# Patient Record
Sex: FEMALE | Race: WHITE | NOT HISPANIC OR LATINO | Employment: OTHER | URBAN - METROPOLITAN AREA
[De-identification: names, ages, dates, MRNs, and addresses within clinical notes are randomized per-mention and may not be internally consistent; named-entity substitution may affect disease eponyms.]

---

## 2018-09-24 ENCOUNTER — LAB (OUTPATIENT)
Dept: LAB | Facility: HOSPITAL | Age: 73
End: 2018-09-24

## 2018-09-24 ENCOUNTER — OFFICE VISIT (OUTPATIENT)
Dept: NEUROLOGY | Facility: CLINIC | Age: 73
End: 2018-09-24

## 2018-09-24 VITALS — SYSTOLIC BLOOD PRESSURE: 108 MMHG | DIASTOLIC BLOOD PRESSURE: 82 MMHG

## 2018-09-24 DIAGNOSIS — R25.1 TREMOR: ICD-10-CM

## 2018-09-24 DIAGNOSIS — G60.9 IDIOPATHIC PERIPHERAL NEUROPATHY: ICD-10-CM

## 2018-09-24 DIAGNOSIS — G60.9 IDIOPATHIC PERIPHERAL NEUROPATHY: Primary | ICD-10-CM

## 2018-09-24 DIAGNOSIS — R42 DIZZINESS: ICD-10-CM

## 2018-09-24 PROBLEM — I10 HYPERTENSION: Status: ACTIVE | Noted: 2018-09-24

## 2018-09-24 PROBLEM — E78.5 HYPERLIPIDEMIA: Status: ACTIVE | Noted: 2018-09-24

## 2018-09-24 PROBLEM — G62.9 PERIPHERAL NEUROPATHY: Status: ACTIVE | Noted: 2018-09-24

## 2018-09-24 PROBLEM — M19.90 ARTHRITIS: Status: ACTIVE | Noted: 2018-09-24

## 2018-09-24 PROBLEM — E03.9 HYPOTHYROIDISM: Status: ACTIVE | Noted: 2018-09-24

## 2018-09-24 LAB
CRP SERPL-MCNC: 0.06 MG/DL (ref 0–1)
ERYTHROCYTE [SEDIMENTATION RATE] IN BLOOD: 40 MM/HR (ref 0–30)
FOLATE SERPL-MCNC: 21.51 NG/ML (ref 3.2–20)
HBA1C MFR BLD: 6.1 % (ref 4.8–5.6)
T4 FREE SERPL-MCNC: 0.94 NG/DL (ref 0.89–1.76)
TSH SERPL DL<=0.05 MIU/L-ACNC: 1.89 MIU/ML (ref 0.35–5.35)
VIT B12 BLD-MCNC: 424 PG/ML (ref 211–911)

## 2018-09-24 PROCEDURE — 84443 ASSAY THYROID STIM HORMONE: CPT

## 2018-09-24 PROCEDURE — 86038 ANTINUCLEAR ANTIBODIES: CPT

## 2018-09-24 PROCEDURE — 82746 ASSAY OF FOLIC ACID SERUM: CPT

## 2018-09-24 PROCEDURE — 84165 PROTEIN E-PHORESIS SERUM: CPT

## 2018-09-24 PROCEDURE — 84439 ASSAY OF FREE THYROXINE: CPT

## 2018-09-24 PROCEDURE — 36415 COLL VENOUS BLD VENIPUNCTURE: CPT

## 2018-09-24 PROCEDURE — 99204 OFFICE O/P NEW MOD 45 MIN: CPT | Performed by: PSYCHIATRY & NEUROLOGY

## 2018-09-24 PROCEDURE — 83036 HEMOGLOBIN GLYCOSYLATED A1C: CPT

## 2018-09-24 PROCEDURE — 84207 ASSAY OF VITAMIN B-6: CPT

## 2018-09-24 PROCEDURE — 84155 ASSAY OF PROTEIN SERUM: CPT

## 2018-09-24 PROCEDURE — 82607 VITAMIN B-12: CPT

## 2018-09-24 PROCEDURE — 86140 C-REACTIVE PROTEIN: CPT

## 2018-09-24 RX ORDER — ASPIRIN 81 MG/1
81 TABLET ORAL DAILY
COMMUNITY

## 2018-09-24 RX ORDER — OMEPRAZOLE 20 MG/1
20 CAPSULE, DELAYED RELEASE ORAL DAILY
COMMUNITY

## 2018-09-24 RX ORDER — ONDANSETRON 4 MG/1
4 TABLET, FILM COATED ORAL EVERY 8 HOURS PRN
COMMUNITY

## 2018-09-24 RX ORDER — FLUDROCORTISONE ACETATE 0.1 MG/1
TABLET ORAL
Qty: 30 TABLET | Refills: 3 | Status: SHIPPED | OUTPATIENT
Start: 2018-09-24 | End: 2018-12-31 | Stop reason: SDUPTHER

## 2018-09-24 RX ORDER — LORAZEPAM 0.5 MG/1
0.5 TABLET ORAL 2 TIMES DAILY
COMMUNITY

## 2018-09-24 RX ORDER — GABAPENTIN 400 MG/1
400 CAPSULE ORAL 3 TIMES DAILY
COMMUNITY

## 2018-09-24 RX ORDER — AMLODIPINE BESYLATE 5 MG/1
5 TABLET ORAL DAILY
COMMUNITY

## 2018-09-24 RX ORDER — DULOXETIN HYDROCHLORIDE 60 MG/1
60 CAPSULE, DELAYED RELEASE ORAL DAILY
COMMUNITY

## 2018-09-24 RX ORDER — DONEPEZIL HYDROCHLORIDE 10 MG/1
10 TABLET, FILM COATED ORAL NIGHTLY
COMMUNITY

## 2018-09-24 RX ORDER — HYDROCHLOROTHIAZIDE 25 MG/1
25 TABLET ORAL DAILY
COMMUNITY

## 2018-09-24 RX ORDER — LEVOTHYROXINE SODIUM 0.03 MG/1
25 TABLET ORAL DAILY
COMMUNITY

## 2018-09-24 NOTE — PROGRESS NOTES
Subjective:    CC: Su Duffy is seen today in consultation at the request of Shakir Agustin DO for Tremors and dizziness       HPI:  73-year-old female accompanied by her daughter and granddaughter with a history of hypertension, hypothyroidism, neuropathy, hyperlipidemia, cognitive impairment, anxiety, arthritis presents with tremors and dizziness.  As per patient she had shingles last year for which she was treated with acyclovir.  During the treatment she had an episode of confusion with visual hallucinations hence the treatment was stopped.  After that she was noted to have cognitive problems and started on Aricept.  She has not had any more episodes since then.  However she has started to feel extremely lightheaded  in the past year with episodes of dizziness and balance problems more so when she stands up.  This has resulted in her falling down a few times although she denies losing consciousness.  She also gets tremors in both hands especially in the morning.  The tremors worsen on performing tasks or reaching out for objects.  She denies any family history of tremors.  She also denies having an difficulty with smell but does complain of constipation and acting out her dreams occasionally (as per daughter).  Currently she denies having any  auditory hallucinations but does see images in the corner of her eye at times like someone passing through.  Has a history of cataracts.  Also has occasional headaches but denies any scalp tenderness or jaw claudication.  She has tingling and numbness in the feet for which she has had no workup.  Denies a history of diabetes or  alcohol intake.  However she was started on gabapentin that has helped with the symptoms.  She had an MRI brain in February 2018 that showed age appropriate minimal white matter changes but no acute intracranial abnormality    The following portions of the patient's history were reviewed today and updated as of 09/24/2018  : allergies,  current medications, past family history, past medical history, past social history, past surgical history and problem list  These document will be scanned to patient's chart.      Current Outpatient Prescriptions:   •  amLODIPine (NORVASC) 5 MG tablet, Take 5 mg by mouth Daily., Disp: , Rfl:   •  aspirin 81 MG EC tablet, Take 81 mg by mouth Daily., Disp: , Rfl:   •  donepezil (ARICEPT) 10 MG tablet, Take 10 mg by mouth Every Night., Disp: , Rfl:   •  DULoxetine (CYMBALTA) 60 MG capsule, Take 60 mg by mouth Daily., Disp: , Rfl:   •  gabapentin (NEURONTIN) 400 MG capsule, Take 400 mg by mouth 3 (Three) Times a Day., Disp: , Rfl:   •  hydrochlorothiazide (HYDRODIURIL) 25 MG tablet, Take 25 mg by mouth Daily., Disp: , Rfl:   •  levothyroxine (SYNTHROID, LEVOTHROID) 25 MCG tablet, Take 25 mcg by mouth Daily., Disp: , Rfl:   •  Loratadine-Pseudoephedrine (CLARITIN-D 12 HOUR PO), Take  by mouth., Disp: , Rfl:   •  LORazepam (ATIVAN) 0.5 MG tablet, Take 0.5 mg by mouth 2 (Two) Times a Day., Disp: , Rfl:   •  metoprolol tartrate (LOPRESSOR) 25 MG tablet, Take 25 mg by mouth 2 (Two) Times a Day., Disp: , Rfl:   •  omeprazole (priLOSEC) 20 MG capsule, Take 20 mg by mouth Daily., Disp: , Rfl:   •  ondansetron (ZOFRAN) 4 MG tablet, Take 4 mg by mouth Every 8 (Eight) Hours As Needed for Nausea or Vomiting., Disp: , Rfl:   •  fludrocortisone 0.1 MG tablet, Take one tablet daily with food.  Avoid lying down for 2 hours after taking the medication., Disp: 30 tablet, Rfl: 3   Past Medical History:   Diagnosis Date   • Anxiety    • Arthritis    • Hyperlipidemia    • Hypertension       History reviewed. No pertinent surgical history.   Family History   Problem Relation Age of Onset   • Cancer Mother    • Heart disease Father    • Stroke Father    • Diabetes Sister    • Heart disease Sister    • Hypertension Sister    • Cancer Brother    • Diabetes Brother    • Heart disease Brother    • Hypertension Brother       Social History      Social History   • Marital status: Unknown     Spouse name: N/A   • Number of children: N/A   • Years of education: N/A     Occupational History   • Not on file.     Social History Main Topics   • Smoking status: Former Smoker   • Smokeless tobacco: Never Used   • Alcohol use No   • Drug use: No   • Sexual activity: Defer     Other Topics Concern   • Not on file     Social History Narrative   • No narrative on file     Review of Systems   Constitutional: Positive for activity change, chills and fatigue.   Eyes: Positive for photophobia, pain, itching and visual disturbance.   Gastrointestinal: Positive for abdominal pain and constipation.   Endocrine: Positive for polydipsia.   Musculoskeletal: Positive for arthralgias, back pain, gait problem, joint swelling and neck stiffness.   Skin: Positive for skin lesions.   Neurological: Positive for tremors and weakness.   Hematological: Bruises/bleeds easily.   Psychiatric/Behavioral: Positive for decreased concentration.   All other systems reviewed and are negative.      Objective:    /82 (BP Location: Right arm, Patient Position: Sitting)     Neurology Exam:    General apperance: NAD.  Good temporal artery pulsations,no scalp tenderness on palpation or jaw claudication, orthostatics showed a drop of 14 mmHg on systolic blood pressure on standing up    Mental status: Alert, awake and oriented to time place and person.    Recent and Remote memory: Mildly impaired    Attention span and Concentration: Normal.     Language and Speech: Intact- No dysarthria.    Fluency, Naming , Repitition and Comprehension:  Intact    Cranial Nerves:   CN II: Visual fields are full. Intact. Fundi - Normal, No papillederma, Pupils - FLORIN  CN III, IV and VI: Extraocular movements are intact. Normal saccades.   CN V: Facial sensation is intact.   CN VII: Muscles of facial expression reveal no asymmetry. Intact.   CN VIII: Hearing is intact. Whispered voice intact.   CN IX and X:  Palate elevates symmetrically. Intact  CN XI: Shoulder shrug is intact.   CN XII: Tongue is midline without evidence of atrophy or fasciculation.     Motor:-No resting tremors appreciated, mild bilateral postural more than intention tremors, could draw a Roths wheel and write without any tremors, no micrographia noted  Right UE muscle strength 5/5. Normal tone.     Left UE muscle strength 5/5. Normal tone.      Right LE muscle strength5/5. Normal tone.     Left LE muscle strength 5/5. Normal tone.      Sensory: Normal light touch, vibration and pinprick sensation bilaterally.    DTRs: 2+ bilaterally in upper and lower extremities.    Babinski: Negative bilaterally.    Co-ordination: Normal finger-to-nose, heel to shin B/L.    Rhomberg: Negative.    Gait: Slow and cautious    Cardiovascular: Regular rate and rhythm without murmur, gallop or rub.    Assessment and Plan:  1. Idiopathic peripheral neuropathy  I will get a neuropathy panel including ESR, CRP as she has occasional headaches.  She can continue gabapentin 400 mg 3 times a day for now.  - NANDINI; Future  - Protein Elec + Interp, Serum; Future  - Sedimentation Rate; Future  - TSH+Free T4; Future  - Vitamin B12 & Folate; Future  - Vitamin B6; Future  - C-reactive Protein; Future  - Hemoglobin A1c; Future    2. Dizziness  Most likely due to autonomic dysfunction.  Orthostatics revealed a 14 point drop in systolic blood pressure today.  I have asked her to keep herself well hydrated and started wearing above-knee moderate compression stockings.  I will also start her on fludrocortisone 0.1 mg daily with food.    I have asked her to reduce her dose of amlodipine to 2.5 mg daily and continue the same dose of metoprolol.    3.Tremors-although her tremors are not typical for Parkinson's disease, she does have other features such as slowing of gait, autonomic dysfunction, constipation and REM behavior disorder.  Given her cognitive issues and occasional visual  hallucinations (which may also be because of her cataracts) I feel she could have Lewy body dementia.  I may start her on Sinemet 10/100 mg in the future.       Return in about 6 weeks (around 11/5/2018).     I spent over 50 minutes with the patient face to face out of which over 50% was spent in management, instructions and education.     Katty Dubois MD

## 2018-09-25 LAB
ALBUMIN SERPL-MCNC: 4.1 G/DL (ref 2.9–4.4)
ALBUMIN/GLOB SERPL: 1.2 {RATIO} (ref 0.7–1.7)
ALPHA1 GLOB FLD ELPH-MCNC: 0.3 G/DL (ref 0–0.4)
ALPHA2 GLOB SERPL ELPH-MCNC: 0.9 G/DL (ref 0.4–1)
ANA SER QL: NEGATIVE
B-GLOBULIN SERPL ELPH-MCNC: 1.3 G/DL (ref 0.7–1.3)
GAMMA GLOB SERPL ELPH-MCNC: 0.9 G/DL (ref 0.4–1.8)
GLOBULIN SER CALC-MCNC: 3.4 G/DL (ref 2.2–3.9)
Lab: NORMAL
M-SPIKE: NORMAL G/DL
PROT PATTERN SERPL ELPH-IMP: NORMAL
PROT SERPL-MCNC: 7.5 G/DL (ref 6–8.5)

## 2018-09-27 LAB — VIT B6 SERPL-MCNC: 42.7 UG/L (ref 2–32.8)

## 2018-10-01 ENCOUNTER — TELEPHONE (OUTPATIENT)
Dept: NEUROLOGY | Facility: CLINIC | Age: 73
End: 2018-10-01

## 2018-10-01 NOTE — TELEPHONE ENCOUNTER
Patient daughter called and advised that she will discuss with mom about A1C and patient does not take any type of B6 supplement at this time but already takes a B12 supplement.  Patient daughter also advised that since starting fludrocortisone patient is having insomnia and can not sleep and has had a dul;l headache.

## 2018-10-01 NOTE — TELEPHONE ENCOUNTER
I want her to take the fludrocortisone in the morning with breakfast.  She should also avoid lying down for 2 hours after taking the medication as that could raise her blood pressure which causes a dull headache.  Try these first and if symptoms still persist then I may switch her to another medicine

## 2018-10-01 NOTE — TELEPHONE ENCOUNTER
----- Message from Katty Dubois MD sent at 9/27/2018  9:25 AM EDT -----  Her vitamin B6 level was higher than normal.  If she is taking supplements then she should stop taking them.  A1c is  elevated in the pre-diabetic range and that may also be the cause of her neuropathy.  I feel she should talk to her primary care doctor about possibly starting low doses of metformin.  I also want her to start vitamin B12 500 µg daily

## 2018-10-01 NOTE — TELEPHONE ENCOUNTER
Patient daughter advised that mother has been taken early morning and does not lay down and is eating with it so they are concerned . Spoke to Dr Dubois and she advised to try to take still and we can change medicine at next visit of she is still have problems

## 2018-10-02 NOTE — TELEPHONE ENCOUNTER
Spoke to patient daughter Skylar and she advised that she understood and feel mom had no improved by follow up they could discuss at that time

## 2018-10-03 ENCOUNTER — TELEPHONE (OUTPATIENT)
Dept: NEUROLOGY | Facility: CLINIC | Age: 73
End: 2018-10-03

## 2018-10-03 NOTE — TELEPHONE ENCOUNTER
She should wear them mostly during the daytime especially when she is walking around the house doing chores.  She can take them off during the night

## 2018-10-03 NOTE — TELEPHONE ENCOUNTER
Patient daughter called and wanted to see if mom should wear compression  socks all the time or just at night?

## 2018-11-05 ENCOUNTER — OFFICE VISIT (OUTPATIENT)
Dept: NEUROLOGY | Facility: CLINIC | Age: 73
End: 2018-11-05

## 2018-11-05 VITALS
OXYGEN SATURATION: 96 % | DIASTOLIC BLOOD PRESSURE: 76 MMHG | BODY MASS INDEX: 27.66 KG/M2 | RESPIRATION RATE: 16 BRPM | HEIGHT: 65 IN | WEIGHT: 166 LBS | HEART RATE: 51 BPM | SYSTOLIC BLOOD PRESSURE: 142 MMHG

## 2018-11-05 DIAGNOSIS — R42 DIZZINESS: Primary | ICD-10-CM

## 2018-11-05 DIAGNOSIS — G60.9 IDIOPATHIC PERIPHERAL NEUROPATHY: ICD-10-CM

## 2018-11-05 DIAGNOSIS — G30.0 EARLY ONSET ALZHEIMER'S DEMENTIA WITHOUT BEHAVIORAL DISTURBANCE (HCC): ICD-10-CM

## 2018-11-05 DIAGNOSIS — F02.80 EARLY ONSET ALZHEIMER'S DEMENTIA WITHOUT BEHAVIORAL DISTURBANCE (HCC): ICD-10-CM

## 2018-11-05 PROBLEM — F03.90 DEMENTIA WITHOUT BEHAVIORAL DISTURBANCE (HCC): Status: ACTIVE | Noted: 2018-11-05

## 2018-11-05 PROCEDURE — 99213 OFFICE O/P EST LOW 20 MIN: CPT | Performed by: PSYCHIATRY & NEUROLOGY

## 2018-11-05 NOTE — PROGRESS NOTES
Subjective:    CC: Su Duffy is seen today in consultation at the request of No ref. provider found for Peripheral Neuropathy (6 week follow up)       HPI:  Current visit-since her last visit she has had a tremendous improvement in her dizziness and has had no more falls.  She started wearing the compression stockings and taking fludrocortisone 0.1 mg daily which she is tolerating well now (initially had insomnia with it ).  She also cut down her Norvasc to 2.5 mg daily.  With regards to her neuropathy it is adequately controlled with gabapentin 400 mg 3 times a day.  Her neuropathy panel showed an elevated B6 level and an A1c of 6.1.  Other labs were normal.  Her headaches and tremors have now resolved.  As per daughter she continues to have mild episodes of confusion occasionally.  She can still carry out her activities of daily living but has stopped driving as she has been disoriented a few times while doing so.  Denies having any hallucinations.  Is currently on Aricept 10 mg daily.      Initial [id number] female accompanied by her daughter and granddaughter with a history of hypertension, hypothyroidism, neuropathy, hyperlipidemia, cognitive impairment, anxiety, arthritis presents with tremors and dizziness.  As per patient she had shingles last year for which she was treated with acyclovir.  During the treatment she had an episode of confusion with visual hallucinations hence the treatment was stopped.  After that she was noted to have cognitive problems and started on Aricept.  She has not had any more episodes since then.  However she has started to feel extremely lightheaded  in the past year with episodes of dizziness and balance problems more so when she stands up.  This has resulted in her falling down a few times although she denies losing consciousness.  She also gets tremors in both hands especially in the morning.  The tremors worsen on performing tasks or reaching out for objects.  She  denies any family history of tremors.  She also denies having an difficulty with smell but does complain of constipation and acting out her dreams occasionally (as per daughter).  Currently she denies having any  auditory hallucinations but does see images in the corner of her eye at times like someone passing through.  Has a history of cataracts.  Also has occasional headaches but denies any scalp tenderness or jaw claudication.  She has tingling and numbness in the feet for which she has had no workup.  Denies a history of diabetes or  alcohol intake.  However she was started on gabapentin that has helped with the symptoms.  She had an MRI brain in February 2018 that showed age appropriate minimal white matter changes but no acute intracranial abnormality    The following portions of the patient's history were reviewed today and updated as of 11/05/2018  : allergies, current medications, past family history, past medical history, past social history, past surgical history and problem list  These document will be scanned to patient's chart.      Current Outpatient Prescriptions:   •  amLODIPine (NORVASC) 5 MG tablet, Take 5 mg by mouth Daily., Disp: , Rfl:   •  aspirin 81 MG EC tablet, Take 81 mg by mouth Daily., Disp: , Rfl:   •  donepezil (ARICEPT) 10 MG tablet, Take 10 mg by mouth Every Night., Disp: , Rfl:   •  DULoxetine (CYMBALTA) 60 MG capsule, Take 60 mg by mouth Daily., Disp: , Rfl:   •  fludrocortisone 0.1 MG tablet, Take one tablet daily with food.  Avoid lying down for 2 hours after taking the medication., Disp: 30 tablet, Rfl: 3  •  gabapentin (NEURONTIN) 400 MG capsule, Take 400 mg by mouth 3 (Three) Times a Day., Disp: , Rfl:   •  hydrochlorothiazide (HYDRODIURIL) 25 MG tablet, Take 25 mg by mouth Daily., Disp: , Rfl:   •  levothyroxine (SYNTHROID, LEVOTHROID) 25 MCG tablet, Take 25 mcg by mouth Daily., Disp: , Rfl:   •  Loratadine-Pseudoephedrine (CLARITIN-D 12 HOUR PO), Take  by mouth., Disp: ,  "Rfl:   •  LORazepam (ATIVAN) 0.5 MG tablet, Take 0.5 mg by mouth 2 (Two) Times a Day., Disp: , Rfl:   •  metoprolol tartrate (LOPRESSOR) 25 MG tablet, Take 25 mg by mouth 2 (Two) Times a Day., Disp: , Rfl:   •  omeprazole (priLOSEC) 20 MG capsule, Take 20 mg by mouth Daily., Disp: , Rfl:   •  ondansetron (ZOFRAN) 4 MG tablet, Take 4 mg by mouth Every 8 (Eight) Hours As Needed for Nausea or Vomiting., Disp: , Rfl:    Past Medical History:   Diagnosis Date   • Anxiety    • Arthritis    • Hyperlipidemia    • Hypertension       No past surgical history on file.   Family History   Problem Relation Age of Onset   • Cancer Mother    • Heart disease Father    • Stroke Father    • Diabetes Sister    • Heart disease Sister    • Hypertension Sister    • Cancer Brother    • Diabetes Brother    • Heart disease Brother    • Hypertension Brother       Social History     Social History   • Marital status: Unknown     Spouse name: N/A   • Number of children: N/A   • Years of education: N/A     Occupational History   • Not on file.     Social History Main Topics   • Smoking status: Former Smoker   • Smokeless tobacco: Never Used   • Alcohol use No   • Drug use: No   • Sexual activity: Defer     Other Topics Concern   • Not on file     Social History Narrative   • No narrative on file     Review of Systems   HENT: Positive for postnasal drip, rhinorrhea and tinnitus.    Eyes: Positive for photophobia and itching.   Musculoskeletal: Positive for arthralgias, back pain, joint swelling and bursitis.   Allergic/Immunologic: Positive for environmental allergies.   Neurological: Positive for dizziness, light-headedness, memory problem and confusion.   Psychiatric/Behavioral: Positive for decreased concentration, dysphoric mood and stress.   All other systems reviewed and are negative.      Objective:    /76   Pulse 51   Resp 16   Ht 165.1 cm (65\")   Wt 75.3 kg (166 lb)   SpO2 96%   BMI 27.62 kg/m²     Neurology Exam:    General " apperance: NAD.     Mental status: Alert, awake and oriented to time (month and year), place and person.    Recent and Remote memory: There.    Attention span and Concentration: Normal.     Language and Speech: Intact- No dysarthria.    Fluency, Naming , Repitition and Comprehension:  Intact    Cranial Nerves:   CN II: Visual fields are full. Intact. Fundi - Normal, No papillederma, Pupils - FLORIN  CN III, IV and VI: Extraocular movements are intact. Normal saccades.   CN V: Facial sensation is intact.   CN VII: Muscles of facial expression reveal no asymmetry. Intact.   CN VIII: Hearing is intact. Whispered voice intact.   CN IX and X: Palate elevates symmetrically. Intact  CN XI: Shoulder shrug is intact.   CN XII: Tongue is midline without evidence of atrophy or fasciculation.     Ophthalmoscopic exam of optic disc-normal    Motor:-No tremors noted today  Right UE muscle strength 5/5. Normal tone.     Left UE muscle strength 5/5. Normal tone.      Right LE muscle strength5/5. Normal tone.     Left LE muscle strength 5/5. Normal tone.      Sensory: Normal light touch, vibration and pinprick sensation bilaterally.    DTRs: 2+ bilaterally in upper and lower extremities.    Babinski: Negative bilaterally.    Co-ordination: Normal finger-to-nose, heel to shin B/L.    Rhomberg: Negative.    Gait: Slow and cautious    Cardiovascular: Regular rate and rhythm without murmur, gallop or rub.    Assessment and Plan:  1. Dizziness  Most likely due to orthostatic hypotension.  Is currently doing well.  Continue fludrocortisone 0.1 mg daily.  Also continue to keep well hydrated and wear above-knee moderate compression stockings  I have asked her to take a full tablet of Norvasc 5 mg if her blood pressure is about 140/90    2. Idiopathic peripheral neuropathy  On gabapentin 400 mg 3 times a day    3. Early onset Alzheimer's dementia without behavioral disturbance  On Aricept 10 mg daily       Return in about 3 months (around  2/5/2019).         Katty Dubois MD

## 2018-11-13 ENCOUNTER — TELEPHONE (OUTPATIENT)
Dept: NEUROLOGY | Facility: CLINIC | Age: 73
End: 2018-11-13

## 2018-12-31 RX ORDER — FLUDROCORTISONE ACETATE 0.1 MG/1
TABLET ORAL
Qty: 30 TABLET | Refills: 3 | Status: SHIPPED | OUTPATIENT
Start: 2018-12-31

## 2021-01-28 ENCOUNTER — IMMUNIZATION (OUTPATIENT)
Dept: VACCINE CLINIC | Facility: HOSPITAL | Age: 76
End: 2021-01-28

## 2021-01-28 PROCEDURE — 0001A: CPT | Performed by: FAMILY MEDICINE

## 2021-01-28 PROCEDURE — 91300 HC SARSCOV02 VAC 30MCG/0.3ML IM: CPT | Performed by: FAMILY MEDICINE

## 2021-02-22 ENCOUNTER — IMMUNIZATION (OUTPATIENT)
Dept: VACCINE CLINIC | Facility: HOSPITAL | Age: 76
End: 2021-02-22

## 2021-02-22 PROCEDURE — 0002A: CPT | Performed by: INTERNAL MEDICINE

## 2021-02-22 PROCEDURE — 91300 HC SARSCOV02 VAC 30MCG/0.3ML IM: CPT | Performed by: INTERNAL MEDICINE

## 2022-01-01 ENCOUNTER — APPOINTMENT (OUTPATIENT)
Dept: GENERAL RADIOLOGY | Facility: HOSPITAL | Age: 77
End: 2022-01-01

## 2022-01-01 ENCOUNTER — HOSPITAL ENCOUNTER (OUTPATIENT)
Facility: HOSPITAL | Age: 77
End: 2022-06-20
Attending: INTERNAL MEDICINE | Admitting: INTERNAL MEDICINE

## 2022-01-01 ENCOUNTER — OUTSIDE FACILITY SERVICE (OUTPATIENT)
Dept: PULMONOLOGY | Facility: CLINIC | Age: 77
End: 2022-01-01

## 2022-01-01 LAB
A-A DO2: 590.9 MMHG (ref 0–300)
ALBUMIN SERPL-MCNC: 2.71 G/DL (ref 3.5–5.2)
ALBUMIN SERPL-MCNC: 2.83 G/DL (ref 3.5–5.2)
ALBUMIN SERPL-MCNC: 2.88 G/DL (ref 3.5–5.2)
ALBUMIN SERPL-MCNC: 3.39 G/DL (ref 3.5–5.2)
ALBUMIN/GLOB SERPL: 0.6 G/DL
ALBUMIN/GLOB SERPL: 0.7 G/DL
ALBUMIN/GLOB SERPL: 0.7 G/DL
ALBUMIN/GLOB SERPL: 0.9 G/DL
ALP SERPL-CCNC: 105 U/L (ref 39–117)
ALP SERPL-CCNC: 108 U/L (ref 39–117)
ALP SERPL-CCNC: 97 U/L (ref 39–117)
ALP SERPL-CCNC: 97 U/L (ref 39–117)
ALT SERPL W P-5'-P-CCNC: 10 U/L (ref 1–33)
ALT SERPL W P-5'-P-CCNC: 11 U/L (ref 1–33)
ALT SERPL W P-5'-P-CCNC: 14 U/L (ref 1–33)
ALT SERPL W P-5'-P-CCNC: 5 U/L (ref 1–33)
ANION GAP SERPL CALCULATED.3IONS-SCNC: 10.6 MMOL/L (ref 5–15)
ANION GAP SERPL CALCULATED.3IONS-SCNC: 11.5 MMOL/L (ref 5–15)
ANION GAP SERPL CALCULATED.3IONS-SCNC: 12.4 MMOL/L (ref 5–15)
ANION GAP SERPL CALCULATED.3IONS-SCNC: 12.9 MMOL/L (ref 5–15)
ARTERIAL PATENCY WRIST A: POSITIVE
AST SERPL-CCNC: 14 U/L (ref 1–32)
AST SERPL-CCNC: 16 U/L (ref 1–32)
AST SERPL-CCNC: 17 U/L (ref 1–32)
AST SERPL-CCNC: 18 U/L (ref 1–32)
ATMOSPHERIC PRESS: 727 MMHG
BACTERIA SPEC AEROBE CULT: ABNORMAL
BACTERIA UR QL AUTO: ABNORMAL /HPF
BASE EXCESS BLDA CALC-SCNC: 9.4 MMOL/L (ref 0–2)
BASOPHILS # BLD AUTO: 0.01 10*3/MM3 (ref 0–0.2)
BASOPHILS # BLD AUTO: 0.02 10*3/MM3 (ref 0–0.2)
BASOPHILS NFR BLD AUTO: 0.1 % (ref 0–1.5)
BASOPHILS NFR BLD AUTO: 0.2 % (ref 0–1.5)
BDY SITE: ABNORMAL
BILIRUB SERPL-MCNC: 0.5 MG/DL (ref 0–1.2)
BILIRUB SERPL-MCNC: 0.5 MG/DL (ref 0–1.2)
BILIRUB SERPL-MCNC: 0.6 MG/DL (ref 0–1.2)
BILIRUB SERPL-MCNC: 0.7 MG/DL (ref 0–1.2)
BILIRUB UR QL STRIP: NEGATIVE
BODY TEMPERATURE: 0 C
BUN SERPL-MCNC: 22 MG/DL (ref 8–23)
BUN SERPL-MCNC: 23 MG/DL (ref 8–23)
BUN SERPL-MCNC: 27 MG/DL (ref 8–23)
BUN SERPL-MCNC: 33 MG/DL (ref 8–23)
BUN/CREAT SERPL: 29 (ref 7–25)
BUN/CREAT SERPL: 34.8 (ref 7–25)
BUN/CREAT SERPL: 38.6 (ref 7–25)
BUN/CREAT SERPL: 43.4 (ref 7–25)
CALCIUM SPEC-SCNC: 8.9 MG/DL (ref 8.6–10.5)
CALCIUM SPEC-SCNC: 8.9 MG/DL (ref 8.6–10.5)
CALCIUM SPEC-SCNC: 9 MG/DL (ref 8.6–10.5)
CALCIUM SPEC-SCNC: 9.5 MG/DL (ref 8.6–10.5)
CHLORIDE SERPL-SCNC: 88 MMOL/L (ref 98–107)
CHLORIDE SERPL-SCNC: 90 MMOL/L (ref 98–107)
CHLORIDE SERPL-SCNC: 92 MMOL/L (ref 98–107)
CHLORIDE SERPL-SCNC: 95 MMOL/L (ref 98–107)
CHOLEST SERPL-MCNC: 128 MG/DL (ref 0–200)
CLARITY UR: CLEAR
CO2 BLDA-SCNC: 34.5 MMOL/L (ref 22–33)
CO2 SERPL-SCNC: 25.1 MMOL/L (ref 22–29)
CO2 SERPL-SCNC: 27.5 MMOL/L (ref 22–29)
CO2 SERPL-SCNC: 30.6 MMOL/L (ref 22–29)
CO2 SERPL-SCNC: 32.4 MMOL/L (ref 22–29)
COHGB MFR BLD: 1.4 % (ref 0–5)
COLOR UR: ABNORMAL
CREAT SERPL-MCNC: 0.57 MG/DL (ref 0.57–1)
CREAT SERPL-MCNC: 0.66 MG/DL (ref 0.57–1)
CREAT SERPL-MCNC: 0.76 MG/DL (ref 0.57–1)
CREAT SERPL-MCNC: 0.93 MG/DL (ref 0.57–1)
CRP SERPL-MCNC: 16.66 MG/DL (ref 0–0.5)
CRP SERPL-MCNC: 17.02 MG/DL (ref 0–0.5)
CRP SERPL-MCNC: 24.34 MG/DL (ref 0–0.5)
CRP SERPL-MCNC: 28.12 MG/DL (ref 0–0.5)
DEPRECATED RDW RBC AUTO: 41.2 FL (ref 37–54)
DEPRECATED RDW RBC AUTO: 42.5 FL (ref 37–54)
DEPRECATED RDW RBC AUTO: 43.2 FL (ref 37–54)
DEPRECATED RDW RBC AUTO: 45.1 FL (ref 37–54)
EGFRCR SERPLBLD CKD-EPI 2021: 63.8 ML/MIN/1.73
EGFRCR SERPLBLD CKD-EPI 2021: 81.3 ML/MIN/1.73
EGFRCR SERPLBLD CKD-EPI 2021: 91 ML/MIN/1.73
EGFRCR SERPLBLD CKD-EPI 2021: 94.3 ML/MIN/1.73
EOSINOPHIL # BLD AUTO: 0.01 10*3/MM3 (ref 0–0.4)
EOSINOPHIL # BLD AUTO: 0.02 10*3/MM3 (ref 0–0.4)
EOSINOPHIL # BLD AUTO: 0.09 10*3/MM3 (ref 0–0.4)
EOSINOPHIL # BLD AUTO: 0.11 10*3/MM3 (ref 0–0.4)
EOSINOPHIL NFR BLD AUTO: 0.1 % (ref 0.3–6.2)
EOSINOPHIL NFR BLD AUTO: 0.2 % (ref 0.3–6.2)
EOSINOPHIL NFR BLD AUTO: 0.9 % (ref 0.3–6.2)
EOSINOPHIL NFR BLD AUTO: 0.9 % (ref 0.3–6.2)
ERYTHROCYTE [DISTWIDTH] IN BLOOD BY AUTOMATED COUNT: 12.1 % (ref 12.3–15.4)
ERYTHROCYTE [DISTWIDTH] IN BLOOD BY AUTOMATED COUNT: 12.2 % (ref 12.3–15.4)
ERYTHROCYTE [DISTWIDTH] IN BLOOD BY AUTOMATED COUNT: 12.4 % (ref 12.3–15.4)
ERYTHROCYTE [DISTWIDTH] IN BLOOD BY AUTOMATED COUNT: 12.6 % (ref 12.3–15.4)
GAS FLOW AIRWAY: 15 LPM
GLOBULIN UR ELPH-MCNC: 3.7 GM/DL
GLOBULIN UR ELPH-MCNC: 4.1 GM/DL
GLOBULIN UR ELPH-MCNC: 4.1 GM/DL
GLOBULIN UR ELPH-MCNC: 4.4 GM/DL
GLUCOSE SERPL-MCNC: 138 MG/DL (ref 65–99)
GLUCOSE SERPL-MCNC: 153 MG/DL (ref 65–99)
GLUCOSE SERPL-MCNC: 177 MG/DL (ref 65–99)
GLUCOSE SERPL-MCNC: 284 MG/DL (ref 65–99)
GLUCOSE UR STRIP-MCNC: NEGATIVE MG/DL
HCO3 BLDA-SCNC: 33.3 MMOL/L (ref 20–26)
HCT VFR BLD AUTO: 26 % (ref 34–46.6)
HCT VFR BLD AUTO: 28.5 % (ref 34–46.6)
HCT VFR BLD AUTO: 29.8 % (ref 34–46.6)
HCT VFR BLD AUTO: 30.5 % (ref 34–46.6)
HCT VFR BLD CALC: 29.6 % (ref 38–51)
HDLC SERPL-MCNC: 49 MG/DL (ref 40–60)
HGB BLD-MCNC: 8 G/DL (ref 12–15.9)
HGB BLD-MCNC: 9.4 G/DL (ref 12–15.9)
HGB BLD-MCNC: 9.6 G/DL (ref 12–15.9)
HGB BLD-MCNC: 9.8 G/DL (ref 12–15.9)
HGB BLDA-MCNC: 9.7 G/DL (ref 13.5–17.5)
HGB UR QL STRIP.AUTO: ABNORMAL
HYALINE CASTS UR QL AUTO: ABNORMAL /LPF
IMM GRANULOCYTES # BLD AUTO: 0.03 10*3/MM3 (ref 0–0.05)
IMM GRANULOCYTES # BLD AUTO: 0.04 10*3/MM3 (ref 0–0.05)
IMM GRANULOCYTES # BLD AUTO: 0.04 10*3/MM3 (ref 0–0.05)
IMM GRANULOCYTES # BLD AUTO: 0.07 10*3/MM3 (ref 0–0.05)
IMM GRANULOCYTES NFR BLD AUTO: 0.3 % (ref 0–0.5)
IMM GRANULOCYTES NFR BLD AUTO: 0.5 % (ref 0–0.5)
IMM GRANULOCYTES NFR BLD AUTO: 0.5 % (ref 0–0.5)
IMM GRANULOCYTES NFR BLD AUTO: 0.6 % (ref 0–0.5)
INHALED O2 CONCENTRATION: 100 %
KETONES UR QL STRIP: ABNORMAL
LDLC SERPL CALC-MCNC: 59 MG/DL (ref 0–100)
LDLC/HDLC SERPL: 1.16 {RATIO}
LEUKOCYTE ESTERASE UR QL STRIP.AUTO: ABNORMAL
LYMPHOCYTES # BLD AUTO: 0.37 10*3/MM3 (ref 0.7–3.1)
LYMPHOCYTES # BLD AUTO: 0.57 10*3/MM3 (ref 0.7–3.1)
LYMPHOCYTES # BLD AUTO: 0.59 10*3/MM3 (ref 0.7–3.1)
LYMPHOCYTES # BLD AUTO: 0.77 10*3/MM3 (ref 0.7–3.1)
LYMPHOCYTES NFR BLD AUTO: 4.6 % (ref 19.6–45.3)
LYMPHOCYTES NFR BLD AUTO: 4.9 % (ref 19.6–45.3)
LYMPHOCYTES NFR BLD AUTO: 6.7 % (ref 19.6–45.3)
LYMPHOCYTES NFR BLD AUTO: 7.8 % (ref 19.6–45.3)
Lab: ABNORMAL
Lab: ABNORMAL
MCH RBC QN AUTO: 30.2 PG (ref 26.6–33)
MCH RBC QN AUTO: 30.6 PG (ref 26.6–33)
MCH RBC QN AUTO: 30.7 PG (ref 26.6–33)
MCH RBC QN AUTO: 30.8 PG (ref 26.6–33)
MCHC RBC AUTO-ENTMCNC: 30.8 G/DL (ref 31.5–35.7)
MCHC RBC AUTO-ENTMCNC: 32.1 G/DL (ref 31.5–35.7)
MCHC RBC AUTO-ENTMCNC: 32.2 G/DL (ref 31.5–35.7)
MCHC RBC AUTO-ENTMCNC: 33 G/DL (ref 31.5–35.7)
MCV RBC AUTO: 93.4 FL (ref 79–97)
MCV RBC AUTO: 95.2 FL (ref 79–97)
MCV RBC AUTO: 95.3 FL (ref 79–97)
MCV RBC AUTO: 98.1 FL (ref 79–97)
METHGB BLD QL: 0.3 % (ref 0–3)
MODALITY: ABNORMAL
MONOCYTES # BLD AUTO: 0.14 10*3/MM3 (ref 0.1–0.9)
MONOCYTES # BLD AUTO: 0.2 10*3/MM3 (ref 0.1–0.9)
MONOCYTES # BLD AUTO: 0.27 10*3/MM3 (ref 0.1–0.9)
MONOCYTES # BLD AUTO: 0.49 10*3/MM3 (ref 0.1–0.9)
MONOCYTES NFR BLD AUTO: 1.6 % (ref 5–12)
MONOCYTES NFR BLD AUTO: 2.2 % (ref 5–12)
MONOCYTES NFR BLD AUTO: 2.5 % (ref 5–12)
MONOCYTES NFR BLD AUTO: 5 % (ref 5–12)
NEUTROPHILS NFR BLD AUTO: 11.06 10*3/MM3 (ref 1.7–7)
NEUTROPHILS NFR BLD AUTO: 7.42 10*3/MM3 (ref 1.7–7)
NEUTROPHILS NFR BLD AUTO: 7.78 10*3/MM3 (ref 1.7–7)
NEUTROPHILS NFR BLD AUTO: 8.42 10*3/MM3 (ref 1.7–7)
NEUTROPHILS NFR BLD AUTO: 85.9 % (ref 42.7–76)
NEUTROPHILS NFR BLD AUTO: 90.9 % (ref 42.7–76)
NEUTROPHILS NFR BLD AUTO: 91.2 % (ref 42.7–76)
NEUTROPHILS NFR BLD AUTO: 92.2 % (ref 42.7–76)
NITRITE UR QL STRIP: NEGATIVE
NOTE: ABNORMAL
NOTIFIED BY: ABNORMAL
NOTIFIED WHO: ABNORMAL
NRBC BLD AUTO-RTO: 0 /100 WBC (ref 0–0.2)
OXYHGB MFR BLDV: 83.6 % (ref 94–99)
PCO2 BLDA: 41.5 MM HG (ref 35–45)
PCO2 TEMP ADJ BLD: ABNORMAL MM[HG]
PH BLDA: 7.51 PH UNITS (ref 7.35–7.45)
PH UR STRIP.AUTO: 5.5 [PH] (ref 5–8)
PH, TEMP CORRECTED: ABNORMAL
PLATELET # BLD AUTO: 178 10*3/MM3 (ref 140–450)
PLATELET # BLD AUTO: 239 10*3/MM3 (ref 140–450)
PLATELET # BLD AUTO: 246 10*3/MM3 (ref 140–450)
PLATELET # BLD AUTO: 255 10*3/MM3 (ref 140–450)
PMV BLD AUTO: 10.1 FL (ref 6–12)
PMV BLD AUTO: 9.5 FL (ref 6–12)
PMV BLD AUTO: 9.8 FL (ref 6–12)
PMV BLD AUTO: 9.9 FL (ref 6–12)
PO2 BLDA: 48 MM HG (ref 83–108)
PO2 TEMP ADJ BLD: ABNORMAL MM[HG]
POTASSIUM SERPL-SCNC: 4.6 MMOL/L (ref 3.5–5.2)
POTASSIUM SERPL-SCNC: 4.6 MMOL/L (ref 3.5–5.2)
POTASSIUM SERPL-SCNC: 4.7 MMOL/L (ref 3.5–5.2)
POTASSIUM SERPL-SCNC: 5.1 MMOL/L (ref 3.5–5.2)
PREALB SERPL-MCNC: 13.5 MG/DL (ref 20–40)
PROT SERPL-MCNC: 6.8 G/DL (ref 6–8.5)
PROT SERPL-MCNC: 7 G/DL (ref 6–8.5)
PROT SERPL-MCNC: 7.1 G/DL (ref 6–8.5)
PROT SERPL-MCNC: 7.2 G/DL (ref 6–8.5)
PROT UR QL STRIP: ABNORMAL
QT INTERVAL: 410 MS
QTC INTERVAL: 435 MS
RBC # BLD AUTO: 2.65 10*6/MM3 (ref 3.77–5.28)
RBC # BLD AUTO: 3.05 10*6/MM3 (ref 3.77–5.28)
RBC # BLD AUTO: 3.13 10*6/MM3 (ref 3.77–5.28)
RBC # BLD AUTO: 3.2 10*6/MM3 (ref 3.77–5.28)
RBC # UR STRIP: ABNORMAL /HPF
REF LAB TEST METHOD: ABNORMAL
SAO2 % BLDCOA: 85 % (ref 94–99)
SODIUM SERPL-SCNC: 126 MMOL/L (ref 136–145)
SODIUM SERPL-SCNC: 129 MMOL/L (ref 136–145)
SODIUM SERPL-SCNC: 135 MMOL/L (ref 136–145)
SODIUM SERPL-SCNC: 138 MMOL/L (ref 136–145)
SP GR UR STRIP: 1.01 (ref 1–1.03)
SQUAMOUS #/AREA URNS HPF: ABNORMAL /HPF
TRIGL SERPL-MCNC: 112 MG/DL (ref 0–150)
UROBILINOGEN UR QL STRIP: ABNORMAL
VENTILATOR MODE: ABNORMAL
VLDLC SERPL-MCNC: 20 MG/DL (ref 5–40)
WBC # UR STRIP: ABNORMAL /HPF
WBC NRBC COR # BLD: 12.12 10*3/MM3 (ref 3.4–10.8)
WBC NRBC COR # BLD: 8.05 10*3/MM3 (ref 3.4–10.8)
WBC NRBC COR # BLD: 8.56 10*3/MM3 (ref 3.4–10.8)
WBC NRBC COR # BLD: 9.81 10*3/MM3 (ref 3.4–10.8)

## 2022-01-01 PROCEDURE — 87040 BLOOD CULTURE FOR BACTERIA: CPT | Performed by: INTERNAL MEDICINE

## 2022-01-01 PROCEDURE — 82805 BLOOD GASES W/O2 SATURATION: CPT

## 2022-01-01 PROCEDURE — 99233 SBSQ HOSP IP/OBS HIGH 50: CPT | Performed by: INTERNAL MEDICINE

## 2022-01-01 PROCEDURE — 85025 COMPLETE CBC W/AUTO DIFF WBC: CPT | Performed by: INTERNAL MEDICINE

## 2022-01-01 PROCEDURE — 81001 URINALYSIS AUTO W/SCOPE: CPT | Performed by: INTERNAL MEDICINE

## 2022-01-01 PROCEDURE — 86140 C-REACTIVE PROTEIN: CPT | Performed by: INTERNAL MEDICINE

## 2022-01-01 PROCEDURE — 82375 ASSAY CARBOXYHB QUANT: CPT

## 2022-01-01 PROCEDURE — 87077 CULTURE AEROBIC IDENTIFY: CPT | Performed by: INTERNAL MEDICINE

## 2022-01-01 PROCEDURE — 71045 X-RAY EXAM CHEST 1 VIEW: CPT

## 2022-01-01 PROCEDURE — 80053 COMPREHEN METABOLIC PANEL: CPT | Performed by: INTERNAL MEDICINE

## 2022-01-01 PROCEDURE — 80061 LIPID PANEL: CPT | Performed by: INTERNAL MEDICINE

## 2022-01-01 PROCEDURE — 71045 X-RAY EXAM CHEST 1 VIEW: CPT | Performed by: RADIOLOGY

## 2022-01-01 PROCEDURE — 84134 ASSAY OF PREALBUMIN: CPT | Performed by: INTERNAL MEDICINE

## 2022-01-01 PROCEDURE — 83050 HGB METHEMOGLOBIN QUAN: CPT

## 2022-01-01 PROCEDURE — 87186 SC STD MICRODIL/AGAR DIL: CPT | Performed by: INTERNAL MEDICINE

## 2022-01-01 PROCEDURE — 93010 ELECTROCARDIOGRAM REPORT: CPT | Performed by: SPECIALIST

## 2022-01-01 PROCEDURE — 93005 ELECTROCARDIOGRAM TRACING: CPT | Performed by: INTERNAL MEDICINE

## 2022-01-01 PROCEDURE — 87086 URINE CULTURE/COLONY COUNT: CPT | Performed by: INTERNAL MEDICINE

## 2022-06-23 LAB
BACTERIA SPEC AEROBE CULT: NORMAL
BACTERIA SPEC AEROBE CULT: NORMAL